# Patient Record
Sex: MALE | Race: OTHER | NOT HISPANIC OR LATINO | URBAN - METROPOLITAN AREA
[De-identification: names, ages, dates, MRNs, and addresses within clinical notes are randomized per-mention and may not be internally consistent; named-entity substitution may affect disease eponyms.]

---

## 2024-06-12 ENCOUNTER — EMERGENCY (EMERGENCY)
Facility: HOSPITAL | Age: 44
LOS: 1 days | Discharge: HOME | End: 2024-06-12
Attending: EMERGENCY MEDICINE
Payer: COMMERCIAL

## 2024-06-12 VITALS
DIASTOLIC BLOOD PRESSURE: 84 MMHG | RESPIRATION RATE: 18 BRPM | OXYGEN SATURATION: 97 % | TEMPERATURE: 98 F | HEART RATE: 83 BPM | SYSTOLIC BLOOD PRESSURE: 125 MMHG

## 2024-06-12 VITALS
OXYGEN SATURATION: 95 % | HEIGHT: 68 IN | HEART RATE: 93 BPM | TEMPERATURE: 99 F | WEIGHT: 194.23 LBS | DIASTOLIC BLOOD PRESSURE: 92 MMHG | RESPIRATION RATE: 20 BRPM | SYSTOLIC BLOOD PRESSURE: 121 MMHG

## 2024-06-12 LAB
ALBUMIN SERPL ELPH-MCNC: 4.1 G/DL — SIGNIFICANT CHANGE UP (ref 3.3–5.2)
ALP SERPL-CCNC: 120 U/L — SIGNIFICANT CHANGE UP (ref 40–120)
ALT FLD-CCNC: 63 U/L — HIGH
ANION GAP SERPL CALC-SCNC: 13 MMOL/L — SIGNIFICANT CHANGE UP (ref 5–17)
APTT BLD: 38.3 SEC — HIGH (ref 24.5–35.6)
AST SERPL-CCNC: 40 U/L — HIGH
BASOPHILS # BLD AUTO: 0.04 K/UL — SIGNIFICANT CHANGE UP (ref 0–0.2)
BASOPHILS NFR BLD AUTO: 0.4 % — SIGNIFICANT CHANGE UP (ref 0–2)
BILIRUB SERPL-MCNC: <0.2 MG/DL — LOW (ref 0.4–2)
BLD GP AB SCN SERPL QL: SIGNIFICANT CHANGE UP
BUN SERPL-MCNC: 7.7 MG/DL — LOW (ref 8–20)
CALCIUM SERPL-MCNC: 9 MG/DL — SIGNIFICANT CHANGE UP (ref 8.4–10.5)
CHLORIDE SERPL-SCNC: 101 MMOL/L — SIGNIFICANT CHANGE UP (ref 96–108)
CO2 SERPL-SCNC: 24 MMOL/L — SIGNIFICANT CHANGE UP (ref 22–29)
CREAT SERPL-MCNC: 0.78 MG/DL — SIGNIFICANT CHANGE UP (ref 0.5–1.3)
EGFR: 113 ML/MIN/1.73M2 — SIGNIFICANT CHANGE UP
EOSINOPHIL # BLD AUTO: 0.56 K/UL — HIGH (ref 0–0.5)
EOSINOPHIL NFR BLD AUTO: 6.2 % — HIGH (ref 0–6)
GLUCOSE SERPL-MCNC: 86 MG/DL — SIGNIFICANT CHANGE UP (ref 70–99)
HCT VFR BLD CALC: 41.4 % — SIGNIFICANT CHANGE UP (ref 39–50)
HGB BLD-MCNC: 12.9 G/DL — LOW (ref 13–17)
IMM GRANULOCYTES NFR BLD AUTO: 0.4 % — SIGNIFICANT CHANGE UP (ref 0–0.9)
INR BLD: 0.99 RATIO — SIGNIFICANT CHANGE UP (ref 0.85–1.18)
LYMPHOCYTES # BLD AUTO: 3.6 K/UL — HIGH (ref 1–3.3)
LYMPHOCYTES # BLD AUTO: 39.6 % — SIGNIFICANT CHANGE UP (ref 13–44)
MCHC RBC-ENTMCNC: 26.3 PG — LOW (ref 27–34)
MCHC RBC-ENTMCNC: 31.2 GM/DL — LOW (ref 32–36)
MCV RBC AUTO: 84.5 FL — SIGNIFICANT CHANGE UP (ref 80–100)
MONOCYTES # BLD AUTO: 0.96 K/UL — HIGH (ref 0–0.9)
MONOCYTES NFR BLD AUTO: 10.5 % — SIGNIFICANT CHANGE UP (ref 2–14)
NEUTROPHILS # BLD AUTO: 3.9 K/UL — SIGNIFICANT CHANGE UP (ref 1.8–7.4)
NEUTROPHILS NFR BLD AUTO: 42.9 % — LOW (ref 43–77)
PLATELET # BLD AUTO: 310 K/UL — SIGNIFICANT CHANGE UP (ref 150–400)
POTASSIUM SERPL-MCNC: 4 MMOL/L — SIGNIFICANT CHANGE UP (ref 3.5–5.3)
POTASSIUM SERPL-SCNC: 4 MMOL/L — SIGNIFICANT CHANGE UP (ref 3.5–5.3)
PROT SERPL-MCNC: 7.5 G/DL — SIGNIFICANT CHANGE UP (ref 6.6–8.7)
PROTHROM AB SERPL-ACNC: 11 SEC — SIGNIFICANT CHANGE UP (ref 9.5–13)
RBC # BLD: 4.9 M/UL — SIGNIFICANT CHANGE UP (ref 4.2–5.8)
RBC # FLD: 15.8 % — HIGH (ref 10.3–14.5)
SODIUM SERPL-SCNC: 138 MMOL/L — SIGNIFICANT CHANGE UP (ref 135–145)
WBC # BLD: 9.1 K/UL — SIGNIFICANT CHANGE UP (ref 3.8–10.5)
WBC # FLD AUTO: 9.1 K/UL — SIGNIFICANT CHANGE UP (ref 3.8–10.5)

## 2024-06-12 PROCEDURE — 86900 BLOOD TYPING SEROLOGIC ABO: CPT

## 2024-06-12 PROCEDURE — 99285 EMERGENCY DEPT VISIT HI MDM: CPT

## 2024-06-12 PROCEDURE — 36410 VNPNXR 3YR/> PHY/QHP DX/THER: CPT | Mod: 59

## 2024-06-12 PROCEDURE — 76937 US GUIDE VASCULAR ACCESS: CPT | Mod: 26

## 2024-06-12 PROCEDURE — 85610 PROTHROMBIN TIME: CPT

## 2024-06-12 PROCEDURE — 99283 EMERGENCY DEPT VISIT LOW MDM: CPT

## 2024-06-12 PROCEDURE — 85025 COMPLETE CBC W/AUTO DIFF WBC: CPT

## 2024-06-12 PROCEDURE — 86850 RBC ANTIBODY SCREEN: CPT

## 2024-06-12 PROCEDURE — 85730 THROMBOPLASTIN TIME PARTIAL: CPT

## 2024-06-12 PROCEDURE — 71045 X-RAY EXAM CHEST 1 VIEW: CPT | Mod: 26

## 2024-06-12 PROCEDURE — 36556 INSERT NON-TUNNEL CV CATH: CPT

## 2024-06-12 PROCEDURE — 36415 COLL VENOUS BLD VENIPUNCTURE: CPT

## 2024-06-12 PROCEDURE — 86901 BLOOD TYPING SEROLOGIC RH(D): CPT

## 2024-06-12 PROCEDURE — 80053 COMPREHEN METABOLIC PANEL: CPT

## 2024-06-12 PROCEDURE — 71045 X-RAY EXAM CHEST 1 VIEW: CPT

## 2024-06-12 PROCEDURE — 76942 ECHO GUIDE FOR BIOPSY: CPT | Mod: 26,59

## 2024-06-12 NOTE — ED PROVIDER NOTE - PATIENT PORTAL LINK FT
You can access the FollowMyHealth Patient Portal offered by [facility] by registering at the following website: [email or web address]. By joining [facility]’s FollowMyHealth portal, you will also be able to view your health information using other applications (apps) compatible with our system.

## 2024-06-12 NOTE — ED ADULT NURSE REASSESSMENT NOTE - NS ED NURSE REASSESS COMMENT FT1
Pt A+Ox4. Respirations are equal and unlabored at this time. Pt speaking complete full sentences. Pt denies complaints at this time. Pt discharged at this time, vital signs recorded, discharge paperwork provided to pt.

## 2024-06-12 NOTE — ED PROVIDER NOTE - CLINICAL SUMMARY MEDICAL DECISION MAKING FREE TEXT BOX
patient s/p removal of picc line. midline placed by vascular access team. will dc with f/up with pmd.

## 2024-06-12 NOTE — PROCEDURE NOTE - ADDITIONAL PROCEDURE DETAILS
4FR 17CM 24CIRC BARD POWER MIDLINE good flash ns flush right brachial vein by ultrasound guidance.  Patient tolerated well.

## 2024-06-12 NOTE — ED ADULT NURSE NOTE - NSFALLHARMRISKINTERV_ED_ALL_ED

## 2024-06-12 NOTE — ED PROVIDER NOTE - PHYSICAL EXAMINATION
General:     NAD  Head:     NC/AT, EOMI, oral mucosa moist  Neck:     trachea midline  Lungs:     CTA b/l, no w/r/r  CVS:     S1S2, RRR, no m/g/r  EXT: heel in dressing, well healed

## 2024-06-12 NOTE — ED PROVIDER NOTE - NSFOLLOWUPINSTRUCTIONS_ED_ALL_ED_FT
You are advised to please follow up with your primary care doctor within the next 24 hours and return to the Emergency Department for worsening symptoms or any other concerns.  Your doctor may call [phone] to follow up on the specific results of the tests performed today in the emergency department.    \Midline Catheter  A midline catheter is a small, thin tube that is inserted into a vein in the upper arm or at the bend in the elbow. Its tip ends at or near the armpit (axillary) area. A midline catheter is a type of IV access.    A midline catheter may be used to:  Give IV fluids and nutrients.  Give medicines.  Draw blood.  Give blood back to the body, such as during a blood transfusion or hemodialysis.  Inject a contrast dye for a CT scan (power injection).  Provide IV access for treatment that lasts 1–4 weeks.  Tell a health care provider about:  Any allergies you have.  All medicines you are taking, including vitamins, herbs, eye drops, creams, and over-the-counter medicines.  Any problems you or family members have had with anesthetic medicines.  Any blood disorders you have.  Any surgeries you have had.  Any medical conditions you have.  Whether you are pregnant or may be pregnant.  Any history of health care providers not being able to find a vein for an IV or blood draw.  What are the risks?  Generally, insertion and use of midline catheters is safe. However, problems may occur, including:  Blood clots. A clot can form in the midline catheter or at its tip.  Phlebitis. This is when the vein becomes warm, swollen, and tender. A red streak may develop along the vein where the midline catheter is inserted.  Leaking (infiltration) of IV fluids or medicine into the tissue surrounding the vein. This can cause swelling, pain, and tissue damage in the arm.  Infection.  Nerve or tendon injury or irritation during midline catheter insertion.  What happens before the procedure?  Follow instructions from your health care provider about eating or drinking restrictions.  Ask your health care provider about changing or stopping your regular medicines. This is especially important if you are taking diabetes medicines or blood thinners.  What happens during the procedure?  Your skin at the IV site will be washed with a germ-killing (antiseptic) solution to help prevent infection.  A bandage or cord (tourniquet) will be tightly wrapped around your upper arm.  An ultrasound may be used to identify the correct vein.  You may be given a medicine to numb the area (local anesthetic). This may be applied to or injected into the skin where the IV catheter will be placed.  A sharp needle will be used to enter the vein, and the catheter will be placed into the vein.  The needle will be taken out, and the catheter will stay in place.  The tourniquet will be removed.  A syringe may be attached to the catheter to make sure that you can draw blood from it.  The catheter may be flushed with a liquid called normal saline or heparin.  A clear bandage (dressing) or tape will be applied to hold the catheter in place.  The procedure may vary among health care providers and hospitals.    What can I expect after the procedure?  The catheter may be capped for use later on, or an IV tube may be attached to give fluids, medicine, or blood.  Your health care provider will check the IV site as needed to make sure:  There is no bleeding, swelling, or pain.  Fluid is flowing through the catheter properly.  There are no signs of infection.  Follow these instructions at home:    Follow instructions from your health care provider about how to take care of your midline catheter at home. To make sure that your catheter works well:  Wash your hands with soap and water for at least 20 seconds before and after caring for or using your midline catheter. If soap and water are not available, use hand .  Before connecting a syringe or tubing to your midline catheter, scrub the tip of your catheter with a new alcohol wipe for 10–14 seconds. Allow the catheter tip to dry completely. Do this every time before you connect the syringe or tubing to your catheter.  Do not let the midline catheter dressing get wet. Change the dressing right away if it becomes wet.  Do not take baths, swim, or use a hot tub until your health care provider approves. Cover the dressing with a watertight covering when you take a bath or a shower.  Do not pull on the midline catheter or tubing. Doing this can move the catheter out of its place in the vein. If the midline catheter is pulled out of place, the IV fluids or medicine you are getting can leak into the surrounding tissue.  Do not allow blood pressure monitoring or needle punctures on the side where the midline catheter is located.  Do not lift anything that is heavier than 10 lb (4.5 kg) or the limit that you are told by your health care provider.  Check your insertion site every day for signs of infection. Check for:  Redness, swelling, or pain.  Fluid or blood.  Warmth.  Pus or a bad smell.  Contact a health care provider if:  The dressing is loose and the midline catheter insertion site is exposed.  The skin is irritated where the dressing has been applied.  Get help right away if:  You have chills or a fever.  There is bleeding at the site where the midline catheter enters your arm.  There is drainage, redness, swelling, discomfort, or warmth in the arm with the midline catheter.  You are unable to flush your midline catheter, or it feels blocked.  The midline catheter is partially or completely pulled out.  Your catheter is broken or leaking.  You are dizzy.  You have chest pain or shortness of breath.  You have an irregular heartbeat.  These symptoms may represent a serious problem that is an emergency. Do not wait to see if the symptoms will go away. Get medical help right away. Call your local emergency services (911 in the [place] Do not drive yourself to the hospital.    Summary  A midline catheter is a small, thin tube that is inserted into a vein in the upper arm or at the bend in the elbow.  A midline catheter may be used to give IV fluids or nutrients, give medicines, draw blood, give blood back to the body, inject a dye for a CT scan, or provide IV access for treatment that lasts 1–4 weeks.  Check your insertion site every day for signs of infection. Signs include redness, swelling, pain, fluid, blood, warmth, pus, or a bad smell.

## 2024-06-12 NOTE — ED PROVIDER NOTE - OBJECTIVE STATEMENT
43 yoM; with PMH significant for DM, HTN, chronic osteo of heel (currently on cefepime and Vanco); now presenting with broken PICC line.  Patient reports that the home visiting nurse was changing the PICC line dressing when the tubing cracked and ripped off with the dressing.  Nurse then remove the entire PICC line.  Patient sent in for replacement of PICC line.  Denies any chest pain, shortness of breath, palpitations.  Denies any fever, chills, sweats.  Reports wound is healing well.

## 2024-06-12 NOTE — ED ADULT NURSE NOTE - OBJECTIVE STATEMENT
Pt A+Ox4, states that he is here to get his midline replaced. Pt states that his left PICC line broke at home this morning while the home care nurse was taking care of him. Pt states that he has an infection in his right heal that requires IV antibiotics which is why he has the PICC line. Pt denies SOB, Cp, DILLON pain, HA, N/V/D. Respirations are equal and unlabored on room air, pt speaking complete coherent sentences. Pt left in position of comfort, wheels of stretcher locked and in the lowest position.

## 2024-07-15 ENCOUNTER — EMERGENCY (EMERGENCY)
Facility: HOSPITAL | Age: 44
LOS: 1 days | Discharge: HOME | End: 2024-07-15
Attending: EMERGENCY MEDICINE
Payer: COMMERCIAL

## 2024-07-15 VITALS
RESPIRATION RATE: 18 BRPM | HEIGHT: 68 IN | WEIGHT: 195.99 LBS | TEMPERATURE: 99 F | DIASTOLIC BLOOD PRESSURE: 86 MMHG | OXYGEN SATURATION: 96 % | SYSTOLIC BLOOD PRESSURE: 130 MMHG | HEART RATE: 105 BPM

## 2024-07-15 DIAGNOSIS — I82.409 ACUTE EMBOLISM AND THROMBOSIS OF UNSPECIFIED DEEP VEINS OF UNSPECIFIED LOWER EXTREMITY: Chronic | ICD-10-CM

## 2024-07-15 PROCEDURE — 99284 EMERGENCY DEPT VISIT MOD MDM: CPT | Mod: 25

## 2024-07-15 PROCEDURE — 93971 EXTREMITY STUDY: CPT | Mod: 26,RT

## 2024-07-15 PROCEDURE — 99284 EMERGENCY DEPT VISIT MOD MDM: CPT

## 2024-07-15 PROCEDURE — 36410 VNPNXR 3YR/> PHY/QHP DX/THER: CPT | Mod: LT

## 2024-07-15 PROCEDURE — 93971 EXTREMITY STUDY: CPT

## 2024-07-15 NOTE — ED STATDOCS - OBJECTIVE STATEMENT
Has midline catheter for treatment of right heel  osteomyelitis with vancomycin and cefepime.  Anticipated duration of therapy until July 24.  Reports pain in arm and bleeding from site since yesterday.  Has been on and unable to flush catheter since last night.  On Eliquis for prior DVTs.  Denies difficulty breathing or chest pain.  Denies fever.

## 2024-07-15 NOTE — ED STATDOCS - ATTENDING APP SHARED VISIT CONTRIBUTION OF CARE
I, Fernandez Lyn, performed the initial face to face bedside interview with this patient regarding history of present illness, review of symptoms and relevant past medical, social and family history.  I completed an independent physical examination.  I was the provider who initially evaluated this patient.  Follow-up on ordered tests (ie labs, radiologic studies) and re-evaluation of the patient's status has been communicated to the ACP.  Disposition of the patient will be based on test outcome and response to ED interventions.

## 2024-07-15 NOTE — ED STATDOCS - CARE PROVIDER_API CALL
Abhijeet Nava  Vascular Surgery  [address] [place] [id number]  Phone: [phone]  Fax: [phone]  Follow Up Time:

## 2024-07-15 NOTE — PROCEDURE NOTE - ADDITIONAL PROCEDURE DETAILS
4FR 17CM 26CIRC BARD POWER MIDLINE good flash ns flush left basilic vein by ultrasound guidance.  Patient tolerated well.

## 2024-07-15 NOTE — ED STATDOCS - PATIENT PORTAL LINK FT
You can access the FollowMyHealth Patient Portal offered by [facility] by registering at the following website: [email or web address]. By joining [facility]’s FollowMyHealth portal, you will also be able to view your health information using other applications (apps) compatible with our system.

## 2024-07-15 NOTE — ED STATDOCS - NSFOLLOWUPINSTRUCTIONS_ED_ALL_ED_FT
Deep Vein Thrombosis    A deep vein thrombosis (DVT) is a blood clot (thrombus) that usually occurs in a deep, larger vein of the lower leg or the pelvis, or in an upper extremity such as the arm. These are dangerous and can lead to serious and even life-threatening complications if the clot travels to the lungs. Symptoms include swelling of the arm or leg, warmth and redness of the arm or leg, and pain. Treatment may include taking a blood thinning medication; make sure to take anything prescribed as instructed.    SEEK IMMEDIATE MEDICAL CARE IF YOU HAVE ANY OF THE FOLLOWING SYMPTOMS: shortness of breath, chest pain, rapid or irregular heartbeat, lightheadedness/dizziness, coughing up blood, or any bleeding in your vomit, stool, or urine. These symptoms may represent a serious problem that is an emergency. Do not wait to see if the symptoms will go away. Call 911 and do not drive yourself to the hospital.    Please follow-up with vascular surgery within 3 days.  Please follow-up with your doctors as scheduled.

## 2024-07-15 NOTE — ED STATDOCS - CARE PLAN
Principal Discharge DX:	Deep vein thrombosis (DVT) of other vein of right upper extremity   1 Principal Discharge DX:	Deep vein thrombosis (DVT) of other vein of right upper extremity  Secondary Diagnosis:	Complication associated with peripherally inserted central catheter (PICC), sequela

## 2024-07-15 NOTE — ED STATDOCS - CLINICAL SUMMARY MEDICAL DECISION MAKING FREE TEXT BOX
Right midline catheter for vancomycin and cefepime treatment with planned therapy to July 24.  Experiencing problems with line since yesterday: Bleeding, pain and unable to flush.  Nonpitting edema of right upper extremity.  Ultrasound Doppler of upper extremity ordered to evaluate for DVT.  Request placed to IV catheter team for replacement of right midline. Right midline catheter for vancomycin and cefepime treatment with planned therapy to July 24.  Experiencing problems with line since yesterday: Bleeding, pain and unable to flush.  Nonpitting edema of right upper extremity.  Ultrasound Doppler of upper extremity ordered to evaluate for DVT.  Request placed to IV catheter team for replacement of right midline.    Samm: 43-year-old male presents emergency department after his midline in the right upper extremity stopped working and there was some bleeding from the site noted.  A new midline was placed by vascular access team to the left arm.  Ultrasound was performed of the right upper extremity which shows DVT involving the right axillary vein and one of the branch of the right brachial vein.  Patient is currently on Eliquis.  Right midline was removed without complication.  Patient advised to continue Eliquis follow-up with his doctors as scheduled as well as vascular surgery.  ED return precautions discussed

## 2024-07-15 NOTE — ED STATDOCS - PHYSICAL EXAMINATION
Toxic-appearing, no acute distress, nonpitting edema of right upper extremity, midline catheter in place with dried blood around insertion site, radial pulse 1+, normal capillary  refill of hand, no gross sensory or motor abnormalities of hand, no axillary adenopathy.

## 2024-07-17 NOTE — CHART NOTE - NSCHARTNOTEFT_GEN_A_CORE
CM received call on discharged patient, spoke with Darrell of [facility] regarding request for Midline note. Home infusion referral sent.

## 2025-02-14 NOTE — ED ADULT TRIAGE NOTE - BP NONINVASIVE SYSTOLIC (MM HG)
RX PROGRESS NOTE: Warfarin Anticoagulation Monitoring Initiation Note    Warfarin prescribed for the indication of mechanical AV.    Target INR is 2.0 - 3.0.    Anticipated duration of therapy is Undetermined.    Patient continued on prior warfarin therapy.  Dose prior to pharmacist inpatient anticoagulation management service consultation was 4mg on M, Th, 2mg Tu, W, Sa, Patino, last skip on F.    Most Recent Lab Values:  INR (no units)   Date/Time Value   02/14/2025 0422 1.1     HGB (g/dL)   Date/Time Value   02/14/2025 0226 14.2     HCT (%)   Date/Time Value   02/14/2025 0226 40.8     PLT (K/mcL)   Date/Time Value   02/14/2025 0226 190     GOT/AST (Units/L)   Date/Time Value   02/14/2025 0226 18     GPT/ALT (Units/L)   Date/Time Value   02/14/2025 0226 45     Serum creatinine: 1.11 mg/dL 02/14/25 0226  Estimated creatinine clearance: 103.9 mL/min  OB/Gyn status: unknown      Medical History:  Weight: Weight: 98.4 kg (02/14/25 0000)  Age: 27 year old  Patient does not have factors that may warrant deviation from the standard protocol (protocol exception).     No pertinent medical history noted.    The patient's medication and allergy profile was reviewed for possible drug-allergy, drug-drug, and drug-herbal interactions.    Assessment & Plan:  For anticoagulation therapy, will increase warfarin dose to 2 mg once.     Pharmacist inpatient anticoagulation management will review/monitor patient daily and order warfarin dose/regimen based on protocol.    Thank you,    Alexandrea Sandoval, PHARMD  2/14/2025 3:55 PM   130